# Patient Record
Sex: MALE | Race: WHITE | NOT HISPANIC OR LATINO | URBAN - METROPOLITAN AREA
[De-identification: names, ages, dates, MRNs, and addresses within clinical notes are randomized per-mention and may not be internally consistent; named-entity substitution may affect disease eponyms.]

---

## 2021-11-23 ENCOUNTER — APPOINTMENT (RX ONLY)
Dept: URBAN - METROPOLITAN AREA CLINIC 332 | Facility: CLINIC | Age: 21
Setting detail: DERMATOLOGY
End: 2021-11-23

## 2021-11-23 DIAGNOSIS — L738 OTHER SPECIFIED DISEASES OF HAIR AND HAIR FOLLICLES: ICD-10-CM

## 2021-11-23 DIAGNOSIS — L663 OTHER SPECIFIED DISEASES OF HAIR AND HAIR FOLLICLES: ICD-10-CM

## 2021-11-23 DIAGNOSIS — L73.9 FOLLICULAR DISORDER, UNSPECIFIED: ICD-10-CM

## 2021-11-23 DIAGNOSIS — B07.8 OTHER VIRAL WARTS: ICD-10-CM

## 2021-11-23 DIAGNOSIS — L20.89 OTHER ATOPIC DERMATITIS: ICD-10-CM | Status: INADEQUATELY CONTROLLED

## 2021-11-23 PROBLEM — L02.222 FURUNCLE OF BACK [ANY PART, EXCEPT BUTTOCK]: Status: ACTIVE | Noted: 2021-11-23

## 2021-11-23 PROCEDURE — ? COUNSELING

## 2021-11-23 PROCEDURE — 99214 OFFICE O/P EST MOD 30 MIN: CPT

## 2021-11-23 PROCEDURE — ? MEDICATION COUNSELING

## 2021-11-23 PROCEDURE — ? PRESCRIPTION

## 2021-11-23 RX ORDER — RUXOLITINIB 15 MG/G
CREAM TOPICAL BID
Qty: 60 | Refills: 2 | Status: ERX | COMMUNITY
Start: 2021-11-23

## 2021-11-23 RX ORDER — DOXYCYCLINE HYCLATE 100 MG/1
CAPSULE, GELATIN COATED ORAL
Qty: 30 | Refills: 1 | Status: ERX | COMMUNITY
Start: 2021-11-23

## 2021-11-23 RX ORDER — CLINDAMYCIN PHOSPHATE 10 MG/ML
LOTION TOPICAL QD
Qty: 60 | Refills: 3 | Status: ERX | COMMUNITY
Start: 2021-11-23

## 2021-11-23 RX ORDER — CLINDAMYCIN PHOSPHATE,BENZOYL PEROXIDE,CETAPHIL 1 %-2.5 %
KIT TOPICAL
Qty: 1 | Refills: 4 | Status: ERX | COMMUNITY
Start: 2021-11-23

## 2021-11-23 RX ADMIN — CLINDAMYCIN PHOSPHATE: 10 LOTION TOPICAL at 00:00

## 2021-11-23 RX ADMIN — RUXOLITINIB: 15 CREAM TOPICAL at 00:00

## 2021-11-23 RX ADMIN — DOXYCYCLINE HYCLATE: 100 CAPSULE, GELATIN COATED ORAL at 00:00

## 2021-11-23 RX ADMIN — CLINDAMYCIN PHOSPHATE,BENZOYL PEROXIDE,CETAPHIL: KIT TOPICAL at 00:00

## 2021-11-23 ASSESSMENT — LOCATION DETAILED DESCRIPTION DERM
LOCATION DETAILED: LEFT ANKLE
LOCATION DETAILED: RIGHT SUPERIOR LATERAL UPPER BACK
LOCATION DETAILED: LEFT KNEE
LOCATION DETAILED: RIGHT MEDIAL DISTAL PRETIBIAL REGION
LOCATION DETAILED: LEFT RADIAL PALM
LOCATION DETAILED: RIGHT KNEE

## 2021-11-23 ASSESSMENT — LOCATION SIMPLE DESCRIPTION DERM
LOCATION SIMPLE: RIGHT PRETIBIAL REGION
LOCATION SIMPLE: LEFT HAND
LOCATION SIMPLE: LEFT KNEE
LOCATION SIMPLE: RIGHT KNEE
LOCATION SIMPLE: LEFT ANKLE
LOCATION SIMPLE: RIGHT UPPER BACK

## 2021-11-23 ASSESSMENT — LOCATION ZONE DERM
LOCATION ZONE: HAND
LOCATION ZONE: LEG
LOCATION ZONE: TRUNK

## 2021-11-23 NOTE — PROCEDURE: MEDICATION COUNSELING
Xelgigiz Pregnancy And Lactation Text: This medication is Pregnancy Category D and is not considered safe during pregnancy.  The risk during breast feeding is also uncertain.

## 2021-11-23 NOTE — PROCEDURE: MEDICATION COUNSELING
Based on your medical history and these are the current health maintenance or preventive care services that you are due for (some may have been done at this visit)  Health Maintenance Due   Topic Date Due     LEAD 12/24 MONTHS (SYSTEM ASSIGNED) (2) 09/05/2015     INFLUENZA VACCINE (SYSTEM ASSIGNED)  09/01/2016       At [facility], we strive to deliver an exceptional experience to you, every time we see you.    If you receive a survey in the mail, please send us back your thoughts. We really do value your feedback.    Your care team's suggested websites for health information:  Www.[employer].org : Up to date and easily searchable information on multiple topics.  Www.medlineplus.gov : medication info, interactive tutorials, watch real surgeries online  Www.familydoctor.org : good info from the Academy of Family Physicians  Www.cdc.gov : public health info, travel advisories, epidemics (H1N1)  Www.aap.org : children's health info, normal development, vaccinations  Www.health.[email or web address] : [place] dept of health, public health issues in [place], N1N1    How to contact your care team:   Team Karoline/Cole [phone]         Pharmacy [phone]    Dr. Easley, Nirali Duval PA-C, Dr. Weaver, Alida AGUILAR CNP, Susana Meyers PA-C, Dr. Mata, and Fabi Chavarria, CARLOS    Team RNs: Mikaela & Yajaira      Clinic hours  M-Th 7 am-7 pm   Fri 7 am-5 pm.   Urgent care M-F 11 am-9 pm,   Sat/Sun 9 am-5 pm.  Pharmacy M-Th 8 am-8 pm Fri 8 am-6 pm  Sat/Sun 9 am-5 pm.     All password changes, disabled accounts, or ID changes in "[facility] [place]"/MyHealth will be done by our Access Services Department.    If you need help with your account or password, call: [phone]. Clinic staff no longer has the ability to change passwords.           Ibuprofen doses for children  Brand Names: Motrin, Advil and others - used for fever and pain relief. It can be given every 6 hours as needed. Do not give to children younger  than 6 months. Ibuprofen (Motrin) and acetaminophen (Tylenol) can be alternated every 3 hours to control fever/pain. They are safe to use together.     Child s Weight   (pounds) Infants  Drops   (50 mg /   1.25 mL)  Children s Suspension Liquid   (100 mg / 5 mL)  Cisco Chewable Tablets   (100 mg each)  Adult Tablets   (200 mg each)    12-17 1.25 mL Not recommended Not recommended Not recommended   18-23 1.875 mL 3.75 ml (3/4 tsp) Not recommended. Not recommended   24-35 Not recommended 5 mL (1 tsp) 1 tablet Not recommended   36-47 Not recommended 7.5 mL (1  tsp) 1  tablets Not recommended   48-59 Not recommended 10 mL (2 tsp) 2 tablets 1 tablet   60-71 Not recommended 12.5 mL (2  tsp) 2  tablets 1 tablet   72-95 Not recommended 15 mL (3 tsp) 3 tablets 1 tablet     Younger than 6 months Not recommended Not recommended Not recommended          * PHARYNGITIS, Strep (Strep Throat), Confirmed (Child)  Sore throat (pharyngitis) is a frequent complaint of children. A bacterial infection can cause a sore throat. Streptococcus is the most common bacteria to cause sore throat in children. This condition is called strep pharyngitis, or strep throat.  Strep throat starts suddenly. Symptoms include a red, swollen throat and swollen lymph nodes, which make it painful to swallow. Red spots may appear on the roof of the mouth. Some children will be flushed and have a fever. Children may refuse to eat or drink. They may also drool a lot. Many children have abdominal pain with strep throat.  As soon as a strep infection is confirmed, antibiotic treatment is started, Treatment may be with an injection or oral antibiotics. Medication may also be given to treat a fever. Children with strep throat will be contagious until they have been taking the antibiotic for 24 hours.  HOME CARE:  Medicines: The doctor has prescribed an antibiotic to treat the infection and possibly medicine to treat a fever. Follow the doctor s instructions for  giving these medicines to your child. Be sure your child finishes all of the antibiotic according to the directions given, e``thais if he or she feels better.  General Care:   1. Allow your child plenty of time to rest.  2. Encourage your child to drink liquids. Some children prefer ice chips, cold drinks, frozen desserts, or popsicles. Others like warm chicken soup or beverages with lemon and honey. Avoid forcing your child to eat.  3. Reduce throat pain by having your child gargle with warm salt water. The gargle should be spit out afterwards, not swallowed. Children over 3 may also get relief from sucking on a hard piece of candy.  4. Ensure that your child does not expose other people, including family members. Family members should wash their hands well with soap and warm water to reduce their risk of getting the infection.  5. Advise school officials,  centers, or other friends who may have had contact with your child about his or her illness.  6. Limit your child s exposure to other people, including family members, until he or she is no longer contagious.  7. Replace your child's toothbrush after he or she has taken the antibiotic for 24 hours to avoid getting reinfected.  FOLLOW UP as advised by the doctor or our staff.  CALL YOUR DOCTOR OR GET PROMPT MEDICAL ATTENTION if any of the following occur:    New or worsening fever greater than 101 F (38.3 C)    Symptoms that are not relieved by the medication    Inability to drink fluids; refusal to drink or eat    Throat swelling, trouble swallowing, or trouble breathing    Earache or trouble hearing    [phone] [facility], [address] [place] [address], [place] [address]. All rights reserved. This information is not intended as a substitute for professional medical care. Always follow your healthcare professional's instructions.     Tremfya Counseling: I discussed with the patient the risks of guselkumab including but not limited to immunosuppression, serious infections, and drug reactions.  The patient understands that monitoring is required including a PPD at baseline and must alert us or the primary physician if symptoms of infection or other concerning signs are noted.